# Patient Record
Sex: FEMALE | Race: WHITE | NOT HISPANIC OR LATINO | Employment: OTHER | URBAN - NONMETROPOLITAN AREA
[De-identification: names, ages, dates, MRNs, and addresses within clinical notes are randomized per-mention and may not be internally consistent; named-entity substitution may affect disease eponyms.]

---

## 2022-05-26 ENCOUNTER — OFFICE VISIT (OUTPATIENT)
Dept: FAMILY MEDICINE CLINIC | Facility: CLINIC | Age: 67
End: 2022-05-26

## 2022-05-26 VITALS
SYSTOLIC BLOOD PRESSURE: 122 MMHG | WEIGHT: 124 LBS | HEIGHT: 65 IN | BODY MASS INDEX: 20.66 KG/M2 | HEART RATE: 99 BPM | DIASTOLIC BLOOD PRESSURE: 78 MMHG | OXYGEN SATURATION: 99 %

## 2022-05-26 DIAGNOSIS — R35.0 URINE FREQUENCY: Primary | ICD-10-CM

## 2022-05-26 LAB
BILIRUB BLD-MCNC: NEGATIVE MG/DL
CLARITY, POC: CLEAR
COLOR UR: YELLOW
EXPIRATION DATE: ABNORMAL
GLUCOSE UR STRIP-MCNC: NEGATIVE MG/DL
KETONES UR QL: NEGATIVE
LEUKOCYTE EST, POC: ABNORMAL
Lab: ABNORMAL
NITRITE UR-MCNC: NEGATIVE MG/ML
PH UR: 5.5 [PH] (ref 5–8)
PROT UR STRIP-MCNC: NEGATIVE MG/DL
RBC # UR STRIP: ABNORMAL /UL
SP GR UR: 1.01 (ref 1–1.03)
UROBILINOGEN UR QL: NORMAL

## 2022-05-26 PROCEDURE — 81003 URINALYSIS AUTO W/O SCOPE: CPT | Performed by: PHYSICIAN ASSISTANT

## 2022-05-26 PROCEDURE — 99213 OFFICE O/P EST LOW 20 MIN: CPT | Performed by: PHYSICIAN ASSISTANT

## 2022-05-26 RX ORDER — LOSARTAN POTASSIUM 50 MG/1
1 TABLET ORAL DAILY
COMMUNITY
Start: 2022-03-18

## 2022-05-26 NOTE — ASSESSMENT & PLAN NOTE
Patient reports having chronic hematuria we will send urine off for culture and refer patient to urology for further work-up.  Will notify patient of any abnormalities and start antibiotic if necessary.   Returned your call

## 2022-05-26 NOTE — PROGRESS NOTES
"Chief Complaint  Urine frequency (Patient reports having increased urination for months now.)    Subjective          Stefani Reeves presents to [facility]  Patient reports today secondary to having increased urine frequency.  Patient states that has been going on for months however worse within the past couple weeks.  Patient reports she would like referral to urology for further work-up.      Objective   Vital Signs:  /78 (BP Location: Left arm, Patient Position: Sitting, Cuff Size: Adult)   Pulse 99   Ht 165.1 cm (65\")   Wt 56.2 kg (124 lb)   SpO2 99%   BMI 20.63 kg/m²   BMI is within normal parameters. No other follow-up for BMI required.      Physical Exam  Vitals and nursing note reviewed.   Constitutional:       General: She is not in acute distress.     Appearance: She is not ill-appearing.   HENT:      Mouth/Throat:      Pharynx: No oropharyngeal exudate.   Eyes:      Pupils: Pupils are equal, round, and reactive to light.   Cardiovascular:      Rate and Rhythm: Normal rate and regular rhythm.   Pulmonary:      Effort: Pulmonary effort is normal. No respiratory distress.   Abdominal:      Tenderness: There is no right CVA tenderness or left CVA tenderness.   Musculoskeletal:         General: Normal range of motion.   Skin:     General: Skin is warm and dry.   Psychiatric:         Mood and Affect: Mood normal.        Result Review :                 Assessment and Plan    Diagnoses and all orders for this visit:    1. Urine frequency (Primary)  Assessment & Plan:  Patient reports having chronic hematuria we will send urine off for culture and refer patient to urology for further work-up.  Will notify patient of any abnormalities and start antibiotic if necessary.    Orders:  -     Urine Culture - Urine, Urine, Clean Catch; Future  -     POC Urinalysis Dipstick, Automated  -     Ambulatory Referral to Urology  -     Urine Culture - Urine, Urine, Clean Catch       "     Follow Up   No follow-ups on file.  Patient was given instructions and counseling regarding her condition or for health maintenance advice. Please see specific information pulled into the AVS if appropriate.

## 2022-05-28 LAB
BACTERIA UR CULT: NO GROWTH
BACTERIA UR CULT: NORMAL

## 2022-08-24 ENCOUNTER — TELEPHONE (OUTPATIENT)
Dept: FAMILY MEDICINE CLINIC | Facility: CLINIC | Age: 67
End: 2022-08-24

## 2022-08-24 ENCOUNTER — OFFICE VISIT (OUTPATIENT)
Dept: FAMILY MEDICINE CLINIC | Facility: CLINIC | Age: 67
End: 2022-08-24

## 2022-08-24 VITALS
BODY MASS INDEX: 19.16 KG/M2 | WEIGHT: 115 LBS | HEIGHT: 65 IN | DIASTOLIC BLOOD PRESSURE: 80 MMHG | SYSTOLIC BLOOD PRESSURE: 142 MMHG

## 2022-08-24 DIAGNOSIS — K59.09 CHRONIC CONSTIPATION: ICD-10-CM

## 2022-08-24 DIAGNOSIS — R41.3 MEMORY LOSS: Primary | ICD-10-CM

## 2022-08-24 PROCEDURE — 99214 OFFICE O/P EST MOD 30 MIN: CPT | Performed by: PHYSICIAN ASSISTANT

## 2022-08-24 PROCEDURE — 36415 COLL VENOUS BLD VENIPUNCTURE: CPT | Performed by: PHYSICIAN ASSISTANT

## 2022-08-24 NOTE — ASSESSMENT & PLAN NOTE
Blood work performed today.  Will refer patient to neurology for further work-up.  In the meantime advised patient that she should only drive to locations that she is very familiar with and or close to home and not at night.  Advised if her confusion becomes worse she will need to discontinue driving she acknowledged understanding

## 2022-08-24 NOTE — PROGRESS NOTES
"Chief Complaint  Memory Loss (Patient states that she is having memory loss, she believe that its her short memory. Also her speech is off. )    Subjective        Stefani Reeves presents to [facility]  Patient reports today secondary to having memory loss.  Patient reports with her sister who states her memory has been declining for the past year.  Reports the patient has been having difficulty understanding and following directions, she has difficulty remembering words, has gotten lost while driving home, and has difficulty repeating words.  Patient states she is able to remember things from distant past.  Patient denies any previous head injury denies any drug or alcohol abuse.    Patient reports having constipation for years.  States she takes MiraLAX however for the past few months it has not been working.    Memory Loss        Objective   Vital Signs:  /80 (BP Location: Right arm, Patient Position: Sitting, Cuff Size: Adult)   Ht 165.1 cm (65\")   Wt 52.2 kg (115 lb)   BMI 19.14 kg/m²   Estimated body mass index is 19.14 kg/m² as calculated from the following:    Height as of this encounter: 165.1 cm (65\").    Weight as of this encounter: 52.2 kg (115 lb).    BMI is within normal parameters. No other follow-up for BMI required.      Physical Exam  Vitals and nursing note reviewed.   Constitutional:       General: She is not in acute distress.     Appearance: She is not ill-appearing.   Cardiovascular:      Rate and Rhythm: Normal rate and regular rhythm.      Pulses: Normal pulses.   Pulmonary:      Effort: Pulmonary effort is normal. No respiratory distress.   Abdominal:      General: Bowel sounds are normal. There is no distension.      Palpations: Abdomen is soft.      Tenderness: There is abdominal tenderness. There is no guarding or rebound.      Comments: Patient with mild upper central abdominal tenderness   Musculoskeletal:         General: Normal range of motion. "   Skin:     General: Skin is warm and dry.   Psychiatric:         Mood and Affect: Mood normal.        Result Review :                Assessment and Plan   Diagnoses and all orders for this visit:    1. Memory loss (Primary)  Assessment & Plan:  Blood work performed today.  Will refer patient to neurology for further work-up    Orders:  -     Ambulatory Referral to Neurology  -     Comprehensive Metabolic Panel; Future  -     CBC & Differential; Future  -     TSH; Future  -     T4, free; Future    2. Chronic constipation  Assessment & Plan:  Advised patient to increase fluids, continue MiraLAX and start docusate in the evenings.  If no relief return to clinic for follow-up               Follow Up   No follow-ups on file.  Patient was given instructions and counseling regarding her condition or for health maintenance advice. Please see specific information pulled into the AVS if appropriate.

## 2022-08-24 NOTE — ASSESSMENT & PLAN NOTE
Advised patient to increase fluids, continue MiraLAX and start docusate in the evenings.  If no relief return to clinic for follow-up

## 2022-08-25 LAB
ALBUMIN SERPL-MCNC: 4.8 G/DL (ref 3.8–4.8)
ALBUMIN/GLOB SERPL: 3 {RATIO} (ref 1.2–2.2)
ALP SERPL-CCNC: 94 IU/L (ref 44–121)
ALT SERPL-CCNC: 20 IU/L (ref 0–32)
AST SERPL-CCNC: 15 IU/L (ref 0–40)
BASOPHILS # BLD AUTO: 0.1 X10E3/UL (ref 0–0.2)
BASOPHILS NFR BLD AUTO: 1 %
BILIRUB SERPL-MCNC: 0.6 MG/DL (ref 0–1.2)
BUN SERPL-MCNC: 8 MG/DL (ref 8–27)
BUN/CREAT SERPL: 8 (ref 12–28)
CALCIUM SERPL-MCNC: 10.1 MG/DL (ref 8.7–10.3)
CHLORIDE SERPL-SCNC: 103 MMOL/L (ref 96–106)
CO2 SERPL-SCNC: 24 MMOL/L (ref 20–29)
CREAT SERPL-MCNC: 0.98 MG/DL (ref 0.57–1)
EGFRCR-CYS SERPLBLD CKD-EPI 2021: 63 ML/MIN/1.73
EOSINOPHIL # BLD AUTO: 0.1 X10E3/UL (ref 0–0.4)
EOSINOPHIL NFR BLD AUTO: 1 %
ERYTHROCYTE [DISTWIDTH] IN BLOOD BY AUTOMATED COUNT: 13 % (ref 11.7–15.4)
GLOBULIN SER CALC-MCNC: 1.6 G/DL (ref 1.5–4.5)
GLUCOSE SERPL-MCNC: 101 MG/DL (ref 65–99)
HCT VFR BLD AUTO: 49.4 % (ref 34–46.6)
HGB BLD-MCNC: 16.5 G/DL (ref 11.1–15.9)
IMM GRANULOCYTES # BLD AUTO: 0 X10E3/UL (ref 0–0.1)
IMM GRANULOCYTES NFR BLD AUTO: 0 %
LYMPHOCYTES # BLD AUTO: 2.2 X10E3/UL (ref 0.7–3.1)
LYMPHOCYTES NFR BLD AUTO: 28 %
MCH RBC QN AUTO: 31.2 PG (ref 26.6–33)
MCHC RBC AUTO-ENTMCNC: 33.4 G/DL (ref 31.5–35.7)
MCV RBC AUTO: 93 FL (ref 79–97)
MONOCYTES # BLD AUTO: 0.4 X10E3/UL (ref 0.1–0.9)
MONOCYTES NFR BLD AUTO: 6 %
NEUTROPHILS # BLD AUTO: 5 X10E3/UL (ref 1.4–7)
NEUTROPHILS NFR BLD AUTO: 64 %
PLATELET # BLD AUTO: 325 X10E3/UL (ref 150–450)
POTASSIUM SERPL-SCNC: 4.7 MMOL/L (ref 3.5–5.2)
PROT SERPL-MCNC: 6.4 G/DL (ref 6–8.5)
RBC # BLD AUTO: 5.29 X10E6/UL (ref 3.77–5.28)
SODIUM SERPL-SCNC: 142 MMOL/L (ref 134–144)
T4 FREE SERPL-MCNC: 1.87 NG/DL (ref 0.82–1.77)
TSH SERPL DL<=0.005 MIU/L-ACNC: 0.78 UIU/ML (ref 0.45–4.5)
WBC # BLD AUTO: 7.8 X10E3/UL (ref 3.4–10.8)

## 2024-10-31 ENCOUNTER — TELEPHONE (OUTPATIENT)
Dept: FAMILY MEDICINE CLINIC | Facility: CLINIC | Age: 69
End: 2024-10-31
Payer: MEDICARE

## 2024-10-31 NOTE — TELEPHONE ENCOUNTER
Caller:     Relationship:     Best call back number:[phone]     What is the best time to reach you:ANYTIME    Who are you requesting to speak with (clinical staff, provider,  specific staff member): DR HOBBS    What was the call regarding: THE SON CHERY CANELA WANTED TO ASK ABOUT SWITCHING A PRESCRIPTION FOR HIS MOTHER    Is it okay if the provider responds through MyChart: NO

## 2024-11-25 ENCOUNTER — TELEMEDICINE (OUTPATIENT)
Dept: FAMILY MEDICINE CLINIC | Facility: CLINIC | Age: 69
End: 2024-11-25
Payer: MEDICARE

## 2024-11-25 ENCOUNTER — TELEPHONE (OUTPATIENT)
Dept: FAMILY MEDICINE CLINIC | Facility: CLINIC | Age: 69
End: 2024-11-25

## 2024-11-25 VITALS
HEIGHT: 65 IN | BODY MASS INDEX: 19.16 KG/M2 | DIASTOLIC BLOOD PRESSURE: 62 MMHG | WEIGHT: 115 LBS | SYSTOLIC BLOOD PRESSURE: 120 MMHG

## 2024-11-25 DIAGNOSIS — G47.00 INSOMNIA, PERSISTENT: ICD-10-CM

## 2024-11-25 DIAGNOSIS — I10 HYPERTENSION, ESSENTIAL: ICD-10-CM

## 2024-11-25 DIAGNOSIS — Z85.51 HISTORY OF BLADDER CANCER: ICD-10-CM

## 2024-11-25 DIAGNOSIS — G30.1 SEVERE LATE ONSET ALZHEIMER'S DEMENTIA WITH AGITATION: Primary | ICD-10-CM

## 2024-11-25 DIAGNOSIS — F02.C11 SEVERE LATE ONSET ALZHEIMER'S DEMENTIA WITH AGITATION: Primary | ICD-10-CM

## 2024-11-25 DIAGNOSIS — R32 BOWEL AND BLADDER INCONTINENCE: ICD-10-CM

## 2024-11-25 DIAGNOSIS — Z79.899 HIGH RISK MEDICATION USE: ICD-10-CM

## 2024-11-25 DIAGNOSIS — R15.9 BOWEL AND BLADDER INCONTINENCE: ICD-10-CM

## 2024-11-25 PROBLEM — R41.3 MEMORY LOSS: Status: RESOLVED | Noted: 2022-08-24 | Resolved: 2024-11-25

## 2024-11-25 PROBLEM — R35.0 URINE FREQUENCY: Status: RESOLVED | Noted: 2022-05-26 | Resolved: 2024-11-25

## 2024-11-25 PROBLEM — C67.9 MALIGNANT NEOPLASM OF URINARY BLADDER: Status: ACTIVE | Noted: 2023-04-04

## 2024-11-25 PROBLEM — K59.09 CHRONIC CONSTIPATION: Status: RESOLVED | Noted: 2022-08-24 | Resolved: 2024-11-25

## 2024-11-25 PROBLEM — C67.9 MALIGNANT NEOPLASM OF URINARY BLADDER: Status: RESOLVED | Noted: 2023-04-04 | Resolved: 2024-11-25

## 2024-11-25 PROCEDURE — 3078F DIAST BP <80 MM HG: CPT | Performed by: FAMILY MEDICINE

## 2024-11-25 PROCEDURE — 1159F MED LIST DOCD IN RCRD: CPT | Performed by: FAMILY MEDICINE

## 2024-11-25 PROCEDURE — 99214 OFFICE O/P EST MOD 30 MIN: CPT | Performed by: FAMILY MEDICINE

## 2024-11-25 PROCEDURE — G2211 COMPLEX E/M VISIT ADD ON: HCPCS | Performed by: FAMILY MEDICINE

## 2024-11-25 PROCEDURE — 3074F SYST BP LT 130 MM HG: CPT | Performed by: FAMILY MEDICINE

## 2024-11-25 PROCEDURE — 1160F RVW MEDS BY RX/DR IN RCRD: CPT | Performed by: FAMILY MEDICINE

## 2024-11-25 RX ORDER — QUETIAPINE FUMARATE 100 MG/1
100 TABLET, FILM COATED ORAL NIGHTLY
Qty: 90 TABLET | Refills: 2 | Status: SHIPPED | OUTPATIENT
Start: 2024-11-25

## 2024-11-25 RX ORDER — TRAZODONE HYDROCHLORIDE 50 MG/1
50-100 TABLET, FILM COATED ORAL NIGHTLY
Qty: 60 TABLET | Refills: 6 | Status: SHIPPED | OUTPATIENT
Start: 2024-11-25

## 2024-11-25 NOTE — ASSESSMENT & PLAN NOTE
Unable to follow-up given her advanced dementia and bedbound status with urology.    No hematuria.    At this point she is comfort care only and was recently discharged from hospice but they do not wish to continue with any urology follow-ups at this time.

## 2024-11-25 NOTE — PROGRESS NOTES
Patient was seen today through synchronous audio/video technology. Verbal consent was obtained. The patient was located at home. Vitals signs were obtained by patient and recorded.  (Vitals obtained by patient's family and POA Jonathan Reeves)    I was located at our [facility] office for this telehealth visit.    Mode of Visit: Video  Location of patient: -HOME-  Location of provider: +[facility]+  You have chosen to receive care through a telehealth visit.  The patient has signed the video visit consent form.  The visit included audio and video interaction. No technical issues occurred during this visit.      Chief Complaint  Establish care, History of HTN, history of bladder cancer, advanced dementia, recently discharged from hospice.     History of Present Illness  Stefani Reeves is a 69 y.o. female presenting via video-conference today for new patient visit to establish care with me as her PCP.    Her son Jonathan Reeves is her POA    She was on hospice about a year (Nov 2023) given fall, patella fracture, and worsening alzheimer's dementia.      Non-responsive and non-verbal with advancing dementia    They do have caregivers hired out of pocket for 24 hr care.     Incontinent of urine and bowel.    On complete air mattress for bedsore prevention.    Caregivers do help with feeding her and prior bedsore has healed.     She does need refills on seroquel 100mg at bedtime for has been on tapering off oxycodone given sleep and agitation through hospice    Discussed adding low-dose trazodone vs remeron for sleep/mood. Would like to start with trazodone given her appetite is good and they do not want to try and increase appetite at this time.     /60.    Encouraged ongoing pressure checks given limitations with telehealth visit.        The following portions of the patient's history were reviewed and updated as appropriate: allergies, current medications, past family history, past  "medical history, past social history, past surgical history and problem list.    Review of Systems   Constitutional:  Negative for appetite change, chills, fever and unexpected weight change.   HENT:  Negative for hearing loss.    Eyes:  Negative for visual disturbance.   Respiratory:  Negative for chest tightness, shortness of breath and wheezing.    Cardiovascular:  Negative for chest pain, palpitations and leg swelling.   Gastrointestinal:  Negative for abdominal pain.   Genitourinary:         Incontinent of bowel and bladder - 24/7 caregivers    Musculoskeletal:  Negative for arthralgias, back pain and gait problem.   Skin:  Negative for rash.   Neurological:  Negative for dizziness and headaches.   Psychiatric/Behavioral:  Positive for agitation, confusion and sleep disturbance.         See HPI.  On seroquel but still  having sleep problems, family interested in adding trazodone.  Tapering off oxycodone from hospice at night for sleep/comfort/agitation        Objective  /62   Ht 165.1 cm (65\")   Wt 52.2 kg (115 lb)   BMI 19.14 kg/m²     Physical Exam  Constitutional:       General: She is not in acute distress.  HENT:      Right Ear: Hearing normal.      Left Ear: Hearing normal.   Pulmonary:      Effort: Pulmonary effort is normal.   Neurological:      Mental Status: Mental status is at baseline. She is disoriented.   Psychiatric:      Comments: Severe alzheimer's dementia - son POA           Assessment/Plan   Diagnoses and all orders for this visit:    1. Severe late onset Alzheimer's dementia with agitation (Primary)  Assessment & Plan:  Continue with Seroquel at bedtime and discussed continued tapering off of her nighttime oxycodone that was prescribed by hospice for agitation and comfort.    She is back off of hospice care after being on hospice for approximately 1 year.    She does have 24/7 caregivers with an air mattress to minimize bedsore risk and she did have a previous bedsore that has " healed!    She is incontinent of bowel and bladder and does require 24-hour assistance and her family is currently providing that with caregivers at home.    We did discuss the main issue being agitation and insomnia problems and they are interested in a trial adding trazodone.  Discussed use, side effects, and expectations.  We did discuss room to adjust if needed.    We did also discuss mirtazapine or doxepin as alternatives if adding trazodone to her Seroquel does not help with her nighttime agitation and sleep difficulties.    Plan for recheck in 6 months or sooner if problems arise.  Continue to monitor blood pressure given history of hypertension but with her home readings she does not need any treatment at this time.    Patient's son, Jonathan Reeves, is her power of  and voiced understanding and is comfortable plan.    Orders:  -     QUEtiapine (SEROquel) 100 MG tablet; Take 1 tablet by mouth Every Night. For mood, sleep, agitation  Dispense: 90 tablet; Refill: 2  -     traZODone (DESYREL) 50 MG tablet; Take 1-2 tablets by mouth Every Night.  Dispense: 60 tablet; Refill: 6    2. Hypertension, essential  Assessment & Plan:  Doing well off losartan.  They will continue to monitor her blood pressure on a regular basis.      3. Insomnia, persistent  Assessment & Plan:  See above    Orders:  -     QUEtiapine (SEROquel) 100 MG tablet; Take 1 tablet by mouth Every Night. For mood, sleep, agitation  Dispense: 90 tablet; Refill: 2  -     traZODone (DESYREL) 50 MG tablet; Take 1-2 tablets by mouth Every Night.  Dispense: 60 tablet; Refill: 6    4. History of bladder cancer  Assessment & Plan:  Unable to follow-up given her advanced dementia and bedbound status with urology.    No hematuria.    At this point she is comfort care only and was recently discharged from hospice but they do not wish to continue with any urology follow-ups at this time.      5. Bowel and bladder incontinence  Assessment & Plan:  See  above.  Continue with ongoing 24/7 care and use of protective undergarments      6. High risk medication use  -     QUEtiapine (SEROquel) 100 MG tablet; Take 1 tablet by mouth Every Night. For mood, sleep, agitation  Dispense: 90 tablet; Refill: 2           Return in about 6 months (around 5/25/2025) for Medicare Wellness, Med recheck.    Nicko Epps MD

## 2024-11-25 NOTE — TELEPHONE ENCOUNTER
Needs TELEHEALTH/MYCHART in 6 mos with me for followup and medicare wellness with telehealth.  Number updated in her chart - her POA Jonathan Reeves.  She has end stage alzheimer's and is homebound and non-verbal.  Thanks.    2 mins  MB  CALLED FAMILY HAVE IT SCHEDULED

## 2024-11-25 NOTE — ASSESSMENT & PLAN NOTE
Continue with Seroquel at bedtime and discussed continued tapering off of her nighttime oxycodone that was prescribed by hospice for agitation and comfort.    She is back off of hospice care after being on hospice for approximately 1 year.    She does have 24/7 caregivers with an air mattress to minimize bedsore risk and she did have a previous bedsore that has healed!    She is incontinent of bowel and bladder and does require 24-hour assistance and her family is currently providing that with caregivers at home.    We did discuss the main issue being agitation and insomnia problems and they are interested in a trial adding trazodone.  Discussed use, side effects, and expectations.  We did discuss room to adjust if needed.    We did also discuss mirtazapine or doxepin as alternatives if adding trazodone to her Seroquel does not help with her nighttime agitation and sleep difficulties.    Plan for recheck in 6 months or sooner if problems arise.  Continue to monitor blood pressure given history of hypertension but with her home readings she does not need any treatment at this time.    Patient's son, Jonathan Reeves, is her power of  and voiced understanding and is comfortable plan.

## 2024-12-09 ENCOUNTER — TELEPHONE (OUTPATIENT)
Dept: FAMILY MEDICINE CLINIC | Facility: CLINIC | Age: 69
End: 2024-12-09
Payer: MEDICARE

## 2024-12-09 NOTE — TELEPHONE ENCOUNTER
Caller: CHERY CANELA    Relationship: POA/Surrogate/Guardian    Best call back number: [phone]    What medication are you requesting: ANTIBIOTIC FOR UTI    What are your current symptoms: URINE HAS AMMONIA SMELL      How long have you been experiencing symptoms: 12/08/2024    Have you had these symptoms before:    [] Yes  [x] No    Have you been treated for these symptoms before:   [] Yes  [x] No    If a prescription is needed, what is your preferred pharmacy and phone number: [facility] - [place] [id number] [facility] - [phone] [facility] [phone] FX     Additional notes: PATIENT SON/POA CALLED IN SAYING A CAREGIVER NOTICE A SMELL ON 12/08/2024. PATIENT SON WOULD LIKE AN ANTIBIOTIC SENT OVER FOR THIS. PLEASE CALL BACK WITH ANY QUESTIONS. PATIENTS SON WOULD LIKE A CRUSHABLE PILL, PATIENT WILL NOT A PILL OR LIQUID WELL.

## 2024-12-11 ENCOUNTER — TELEPHONE (OUTPATIENT)
Dept: FAMILY MEDICINE CLINIC | Facility: CLINIC | Age: 69
End: 2024-12-11
Payer: MEDICARE

## 2024-12-11 NOTE — TELEPHONE ENCOUNTER
CALLED PATIENT AND SON ANSWERED HE STATED HE CAN'T GET HER IN THE OFFICE SHE IS ON BED REST AND WAS WANTING TO DO A TELEHEALTH. SON ALSO WANTED DR. HOBBS TO CALL HIM WHEN HE GETS A CHANCE

## 2024-12-11 NOTE — TELEPHONE ENCOUNTER
My apologies - I now realize she is bed bound.  Is she having any fever or chills.  It could be a yeast infection and antibiotics would worsen that.  Please clarify

## 2024-12-12 NOTE — TELEPHONE ENCOUNTER
LVM for pt to call back with clarified answers so were able to proceed with pt care. Waiting for pt to call back